# Patient Record
(demographics unavailable — no encounter records)

---

## 2025-04-21 NOTE — REVIEW OF SYSTEMS
[Constipation] : constipation [Negative] : Heme/Lymph [FreeTextEntry7] : Went from daily to every other day BM's

## 2025-04-21 NOTE — HISTORY OF PRESENT ILLNESS
[Menopause Age: ____] : age at menopause was [unfilled] [TextBox_4] :  - 2 NVD's & step-daughter. Feels well. No PMB. Had Covid  & the vaccine. F  at 75 of Covid. Pt had a stressful yr () - her M, 75, survived Covid. A daughter was  in 2022 - [employer].. Her wife plans to have a baby soon. Feels well. No gyn complaints. Normal bladder & bowel function. [employer]  - retiring this yr - . Plans to get a place in [place]. History of thyroid nodules.  3/30/2022 ultrasound-guided fine-needle aspiration showed benign appearing follicular cells in 2 right upper pole nodules.  A left midpole nodule showed atypia of undetermined significance.  25: Retired. 3 month old grandaughter. Travelling. Feels well. No gyn complaints. Normal bladder & bowel function. Pt feels that menop sx's are more noticeable. More night sweats - sleeps relatively well. Identical twin sister experiencing the same. Pt is not interested in HRT. Good bladder control. Husb is a retired . [Mammogramdate] : 5/17/2023 [TextBox_19] : Nilsa lifetime risk: 5.2%.  Almost entirely fatty; negative [PapSmeardate] : 1/25/2023 [TextBox_31] : Negative; HPV Negative [ColonoscopyDate] : 3/13/19 [TextBox_43] : Negative - Dr. MIKA Anthony. Repeat in 7-10 yrs [TextBox_6] : No vasomotor sx's. Mild sx's resolved w/ wgt loss of 35 lbs (since 5/2020 - diet & exercise)  No vag dryness.  [FreeTextEntry1] : 12/2019

## 2025-04-21 NOTE — PHYSICAL EXAM
[No Lymphadenopathy] : no lymphadenopathy [Examination Of The Breasts] : a normal appearance [No Masses] : no breast masses were palpable [Vulvar Atrophy] : vulvar atrophy [Labia Majora] : normal [Labia Minora] : normal [Atrophy] : atrophy [Normal] : normal [Enlarged ___ wks] : enlarged [unfilled] ~Uweeks [Anteversion] : anteverted [Uterine Adnexae] : normal [No Tenderness] : no tenderness [MA] : MA [Soft] : soft [Non-tender] : non-tender [No HSM] : No HSM [No Mass] : no mass [FreeTextEntry2] :  Cleve Brito [FreeTextEntry3] : No thyroid nodules [FreeTextEntry4] : Very good support & strong levator contraction [FreeTextEntry6] : R cornual myoma; 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

## 2025-04-21 NOTE — HISTORY OF PRESENT ILLNESS
[Menopause Age: ____] : age at menopause was [unfilled] [TextBox_4] :  - 2 NVD's & step-daughter. Feels well. No PMB. Had Covid  & the vaccine. F  at 75 of Covid. Pt had a stressful yr () - her M, 75, survived Covid. A daughter was  in 2022 - [employer].. Her wife plans to have a baby soon. Feels well. No gyn complaints. Normal bladder & bowel function. [employer]  - retiring this yr - . Plans to get a place in [place]. History of thyroid nodules.  3/30/2022 ultrasound-guided fine-needle aspiration showed benign appearing follicular cells in 2 right upper pole nodules.  A left midpole nodule showed atypia of undetermined significance.  25: Retired. 3 month old grandaughter. Travelling. Feels well. No gyn complaints. Normal bladder & bowel function. Pt feels that menop sx's are more noticeable. More night sweats - sleeps relatively well. Identical twin sister experiencing the same. Pt is not interested in HRT. Good bladder control. Husb is a retired . [Mammogramdate] : 5/17/2023 [TextBox_19] : Nilsa lifetime risk: 5.2%.  Almost entirely fatty; negative [PapSmeardate] : 1/25/2023 [TextBox_31] : Negative; HPV Negative [ColonoscopyDate] : 3/13/19 [TextBox_43] : Negative - Dr. MIKA Anthony. Repeat in 7-10 yrs [TextBox_6] : No vasomotor sx's. Mild sx's resolved w/ wgt loss of 35 lbs (since 5/2020 - diet & exercise)  No vag dryness.  [FreeTextEntry1] : 12/2019